# Patient Record
Sex: FEMALE | Race: WHITE | NOT HISPANIC OR LATINO | Employment: FULL TIME | URBAN - NONMETROPOLITAN AREA
[De-identification: names, ages, dates, MRNs, and addresses within clinical notes are randomized per-mention and may not be internally consistent; named-entity substitution may affect disease eponyms.]

---

## 2017-07-27 ENCOUNTER — TRANSCRIBE ORDERS (OUTPATIENT)
Dept: ADMINISTRATIVE | Facility: HOSPITAL | Age: 32
End: 2017-07-27

## 2017-07-27 DIAGNOSIS — N63.0 BREAST NODULE: ICD-10-CM

## 2017-07-27 DIAGNOSIS — N64.4 BREAST PAIN, LEFT: Primary | ICD-10-CM

## 2017-07-31 ENCOUNTER — HOSPITAL ENCOUNTER (OUTPATIENT)
Dept: ULTRASOUND IMAGING | Facility: HOSPITAL | Age: 32
Discharge: HOME | End: 2017-07-31
Admitting: NURSE PRACTITIONER

## 2017-07-31 DIAGNOSIS — N64.4 BREAST PAIN, LEFT: ICD-10-CM

## 2017-07-31 DIAGNOSIS — N63.0 BREAST NODULE: ICD-10-CM

## 2017-07-31 PROCEDURE — 76642 ULTRASOUND BREAST LIMITED: CPT

## 2018-02-07 ENCOUNTER — TRANSCRIBE ORDERS (OUTPATIENT)
Dept: ADMINISTRATIVE | Facility: HOSPITAL | Age: 33
End: 2018-02-07

## 2018-02-07 DIAGNOSIS — N63.0 BREAST LUMP: Primary | ICD-10-CM

## 2018-02-08 ENCOUNTER — HOSPITAL ENCOUNTER (OUTPATIENT)
Dept: ULTRASOUND IMAGING | Facility: HOSPITAL | Age: 33
Discharge: HOME | End: 2018-02-08
Admitting: FAMILY MEDICINE

## 2018-02-08 DIAGNOSIS — N63.0 BREAST LUMP: ICD-10-CM

## 2018-02-08 PROCEDURE — 76642 ULTRASOUND BREAST LIMITED: CPT

## 2019-03-27 ENCOUNTER — OFFICE VISIT (OUTPATIENT)
Dept: OTOLARYNGOLOGY | Facility: CLINIC | Age: 34
End: 2019-03-27

## 2019-03-27 VITALS
HEART RATE: 80 BPM | BODY MASS INDEX: 25.61 KG/M2 | SYSTOLIC BLOOD PRESSURE: 145 MMHG | DIASTOLIC BLOOD PRESSURE: 82 MMHG | HEIGHT: 64 IN | RESPIRATION RATE: 20 BRPM | WEIGHT: 150 LBS | TEMPERATURE: 98 F

## 2019-03-27 DIAGNOSIS — D48.9 NEOPLASM OF UNCERTAIN BEHAVIOR: Primary | ICD-10-CM

## 2019-03-27 PROCEDURE — 99203 OFFICE O/P NEW LOW 30 MIN: CPT | Performed by: OTOLARYNGOLOGY

## 2019-03-27 RX ORDER — MULTIPLE VITAMINS W/ MINERALS TAB 9MG-400MCG
1 TAB ORAL DAILY
COMMUNITY

## 2019-03-27 RX ORDER — LEVOTHYROXINE SODIUM 0.05 MG/1
50 TABLET ORAL DAILY
COMMUNITY

## 2019-03-27 NOTE — PROGRESS NOTES
"CC:   Chief Complaint   Patient presents with   • Skin Lesion     Neck Lesion       HPI: Maris Luther is a 33 y.o. female who reports a skin lesion on the left neck.  This lesion has been present for 2 years.  The lesion has changed. She reports this lesion has been changing in color. She also states this lesion easily becomes irritated and sore.  She then will find herself picking at it sometimes.  Nothing makes the lesion better or worse.  A biopsy has not been performed.    Prior history of skin cancer: none    Dermatologist: Isabelle Singleton MD      PFSH:  Past Medical History:   Diagnosis Date   • Hypothyroidism    • Neoplasm of uncertain behavior      Past Surgical History:   Procedure Laterality Date   •  SECTION       History reviewed. No pertinent family history.  Social History     Tobacco Use   • Smoking status: Never Smoker   • Smokeless tobacco: Never Used   Substance Use Topics   • Alcohol use: Yes     Comment: social   • Drug use: Not on file     Allergies:  Penicillins    Current Outpatient Medications:   •  levothyroxine (SYNTHROID, LEVOTHROID) 50 MCG tablet, Take 50 mcg by mouth Daily., Disp: , Rfl:   •  Multiple Vitamins-Minerals (MULTIVITAMIN WITH MINERALS) tablet tablet, Take 1 tablet by mouth Daily., Disp: , Rfl:     ROS:  Review of Systems   Constitutional: Negative for activity change, appetite change, chills, fatigue, fever and unexpected weight change.   HENT: Negative for congestion, dental problem, facial swelling and nosebleeds.    Eyes: Negative for discharge and redness.   Skin: Negative for color change, pallor and rash.   Hematological: Negative for adenopathy. Does not bruise/bleed easily.       PE:  /82   Pulse 80   Temp 98 °F (36.7 °C)   Resp 20   Ht 162.6 cm (64\")   Wt 68 kg (150 lb)   BMI 25.75 kg/m²   Physical Exam   Constitutional: She is oriented to person, place, and time. She appears well-developed and well-nourished. She is cooperative. No " distress.   HENT:   Head: Normocephalic and atraumatic.   Right Ear: External ear normal.   Left Ear: External ear normal.   Nose: Nose normal. No nasal deformity.   Mouth/Throat: Uvula is midline, oropharynx is clear and moist and mucous membranes are normal.   Eyes: Conjunctivae, EOM and lids are normal. Pupils are equal, round, and reactive to light. Right eye exhibits no discharge. Left eye exhibits no discharge. No scleral icterus.   Neck: Normal range of motion and phonation normal. Neck supple. No tracheal deviation present.       Pulmonary/Chest: Effort normal. No stridor.   Musculoskeletal: Normal range of motion. She exhibits no edema or deformity.   Lymphadenopathy:     She has no cervical adenopathy.   Neurological: She is alert and oriented to person, place, and time. She has normal strength. No cranial nerve deficit. Coordination normal.   Skin: Skin is warm and dry. No rash noted. She is not diaphoretic. No erythema. No pallor.   Psychiatric: She has a normal mood and affect. Her speech is normal and behavior is normal. Judgment and thought content normal. Cognition and memory are normal.   Nursing note and vitals reviewed.                  Assessment:  1. Neoplasm of uncertain behavior        Plan:    1.  I have offered excision of the lesion of the left neck with linear closure and permanent section analysis in the office under local anesthesia.  Since the lesion is changing and irritated, I feel this would be medically necessary to exclude dysplasia.  Dermoscopy performed and imported.  2. The risks and benefits of my recommendations, as well as other treatment options were discussed with the patient today.   3. Discussion of skin lesion. Discussed risks, benefits, alternatives, and possible complications of excision of the skin lesion with reconstruction utilizing local tissue rearrangement, full-thickness skin grafting, or local interpolated flaps. Risks include, but are not limited too:  bleeding, infection, hematoma, recurrence, need for additional procedures, flap failure, cosmetic deformity. Patient understands risks and would like to proceed with surgery.     QUALITY MEASURES    Body Mass Index Screening and Follow-Up Plan  Body mass index is 25.75 kg/m².  Patient's Body mass index is 25.75 kg/m². BMI is above normal parameters. Recommendations include: referral to primary care.    Tobacco Use: Screening and Cessation Intervention  Social History    Tobacco Use      Smoking status: Never Smoker      Smokeless tobacco: Never Used    Return for 1 week postoperatively.      Blair Clemente MD   03/27/2019  9:41 AM

## 2019-06-25 ENCOUNTER — PROCEDURE VISIT (OUTPATIENT)
Dept: OTOLARYNGOLOGY | Facility: CLINIC | Age: 34
End: 2019-06-25

## 2019-06-25 VITALS
TEMPERATURE: 98 F | HEIGHT: 64 IN | WEIGHT: 150 LBS | SYSTOLIC BLOOD PRESSURE: 132 MMHG | DIASTOLIC BLOOD PRESSURE: 78 MMHG | BODY MASS INDEX: 25.61 KG/M2 | RESPIRATION RATE: 20 BRPM | HEART RATE: 95 BPM

## 2019-06-25 DIAGNOSIS — D48.5 NEOPLASM OF UNCERTAIN BEHAVIOR OF SKIN: ICD-10-CM

## 2019-06-25 DIAGNOSIS — D48.9 NEOPLASM OF UNCERTAIN BEHAVIOR: Primary | ICD-10-CM

## 2019-06-25 PROCEDURE — 88305 TISSUE EXAM BY PATHOLOGIST: CPT | Performed by: OTOLARYNGOLOGY

## 2019-06-25 PROCEDURE — 11421 EXC H-F-NK-SP B9+MARG 0.6-1: CPT | Performed by: OTOLARYNGOLOGY

## 2019-06-25 PROCEDURE — 12041 INTMD RPR N-HF/GENIT 2.5CM/<: CPT | Performed by: OTOLARYNGOLOGY

## 2019-06-25 NOTE — PROGRESS NOTES
Preprocedure diagnosis: Neoplasm of uncertain behavior skin of left neck    Post procedure diagnosis: Neoplasm of uncertain behavior skin of left neck    Procedure performed: 1) Excision malignant neoplasm of skin of left neck, 7 mm x 16 mm     2) intermediate closure of skin of left neck, 2.0 cm    Surgeon: Blair Clemente M.D.    Anesthesia: Local with 3 cc of 1% lidocaine with 1:100,000 epinephrine    Specimen:  Neoplasm of uncertain behavior skin of left neck - stitch at 12 o'clock, permanent     Complications: None    Disposition: Home    Lesion: 4 mm x 4 mm  Margins: 1.5 mm  Defect: 7 mm x 16 mm  Depth: Through dermis  Closure Length: 2.0 cm    Details: After patient verification and informed consent was obtained, the skin was prepped with alcohol and infiltrated with 1% lidocaine with 1:100,000 epinephrine.  After approximately 10-15 minutes the skin was tested for anesthesia.  The skin was then sterilely prepped with Hibiclens and the patient was sterilely draped.    The skin surrounding the lesion was incised in fusiform fashion with a 15 blade beveling the incision laterally to facilitate wound eversion.  The skin was grasped and the lesion was removed from the underlying tissue utilizing sharp dissection with the 15 blade.  The specimen was oriented with a stitch at 12 o'clock and sent for frozen section analysis.  Hemostasis was obtained with bipolar cautery set at 12.      The surrounding skin was undermined in the subcutaneous plane for approximately 1 cm in each direction utilizing a fresh 15 blade.  Hemostasis was again obtained with bipolar cautery set at 12.  The tissue was advanced and closed utilizing 5-0 undyed Vicryl suture in buried fashion, followed by simple 5-0 Prolene suture.  Antibiotic ointment was placed to the incision.    The patient tolerated the procedure without any complication.

## 2019-06-27 LAB
CYTO UR: NORMAL
LAB AP CASE REPORT: NORMAL
LAB AP CLINICAL INFORMATION: NORMAL
PATH REPORT.FINAL DX SPEC: NORMAL
PATH REPORT.GROSS SPEC: NORMAL

## 2019-07-01 ENCOUNTER — OFFICE VISIT (OUTPATIENT)
Dept: OTOLARYNGOLOGY | Facility: CLINIC | Age: 34
End: 2019-07-01

## 2019-07-01 VITALS — RESPIRATION RATE: 16 BRPM | BODY MASS INDEX: 25.61 KG/M2 | HEIGHT: 64 IN | WEIGHT: 150 LBS

## 2019-07-01 DIAGNOSIS — D22.4 COMPOUND NEVUS OF NECK: Primary | ICD-10-CM

## 2019-07-01 PROCEDURE — 99024 POSTOP FOLLOW-UP VISIT: CPT | Performed by: OTOLARYNGOLOGY

## 2019-07-01 NOTE — PROGRESS NOTES
Maris Luther returns to the office following excision of a lesion of the left neck with intermediate closure on June 25, 2019.    SUBJECTIVE:  Denies any fevers, chills, significant pain.    OBJECTIVE:  There were no vitals taken for this visit.  Sutures were removed.  Per my MA, this is healing well    Pathology:      ASSESSMENT:  Diagnoses and all orders for this visit:    Compound nevus of neck        PLAN:   Follow-up 3 months    Protect the incisions from sunlight. Sunlight to the incisions will cause permanent pigmentation to the incision line and make the incision more noticeable. After the incision has reepithelialized (typically 2-3 weeks after the procedure), you may begin to use sunscreen with an SPF of 15 or greater    I discussed the use of a silicone-based wound cream to the incisions to optimize the end result. Apply topically twice daily, or as directed, to help optimize wound healing and decrease redness.    We discussed the importance of routine skin checks with a dermatologist or your PCP every 6-12 months.       Blair Clemente MD   07/01/2019  11:12 AM

## 2020-01-23 ENCOUNTER — OFFICE VISIT (OUTPATIENT)
Dept: OBGYN | Age: 35
End: 2020-01-23
Payer: COMMERCIAL

## 2020-01-23 VITALS
SYSTOLIC BLOOD PRESSURE: 134 MMHG | HEIGHT: 64 IN | WEIGHT: 149 LBS | DIASTOLIC BLOOD PRESSURE: 82 MMHG | BODY MASS INDEX: 25.44 KG/M2

## 2020-01-23 PROCEDURE — 99385 PREV VISIT NEW AGE 18-39: CPT | Performed by: ADVANCED PRACTICE MIDWIFE

## 2020-01-23 RX ORDER — LEVOTHYROXINE SODIUM 0.1 MG/1
75 TABLET ORAL DAILY
COMMUNITY

## 2020-01-23 NOTE — PROGRESS NOTES
Pt is here for breast exam and pap smear.    She is a transfer from Dr. Caitlin Beltran    Last mammogram:  na  Last pap smear:  2018  Contraception:   had vasectomy  :  3  Para:  3  AB:    Last bone density:  na  Last colonoscopy:   na
distress. Breath sounds: Normal breath sounds. Abdominal:      General: There is no distension. Palpations: Abdomen is soft. There is no mass. Tenderness: There is no tenderness. There is no guarding or rebound. Hernia: No hernia is present. Genitourinary:     Vagina: Normal.      Comments: EGBUS normal. Vulva reveals no erythema, lesions, or atrophic changes. Vaginal normal, no lesions, polyps, or discharge. Cervix is normal, smooth, pink mucosa. No lesions. No polyps. Musculoskeletal: Normal range of motion. Skin:     General: Skin is warm and dry. Neurological:      Mental Status: She is alert and oriented to person, place, and time. Psychiatric:         Behavior: Behavior normal.         Thought Content: Thought content normal.         Judgment: Judgment normal.          Diagnosis Orders   1. Women's annual routine gynecological examination  Human papillomavirus (HPV) DNA probe thin prep high risk    PAP SMEAR   2. Screening for HPV (human papillomavirus)  Human papillomavirus (HPV) DNA probe thin prep high risk    PAP SMEAR   3. Cervical cancer screening  Human papillomavirus (HPV) DNA probe thin prep high risk    PAP SMEAR   4. Screening breast examination     5. History of abnormal cervical Pap smear         PLAN:  MEDICATIONS:  No orders of the defined types were placed in this encounter. ORDERS:  Orders Placed This Encounter   Procedures    Human papillomavirus (HPV) DNA probe thin prep high risk    PAP SMEAR       Plan:    ICD-10-CM    1. Women's annual routine gynecological examination Z01.419 Human papillomavirus (HPV) DNA probe thin prep high risk     PAP SMEAR   2. Screening for HPV (human papillomavirus) Z11.51 Human papillomavirus (HPV) DNA probe thin prep high risk     PAP SMEAR   3. Cervical cancer screening Z12.4 Human papillomavirus (HPV) DNA probe thin prep high risk     PAP SMEAR   4. Screening breast examination Z12.39    5.  History of abnormal cervical

## 2020-01-29 LAB
HPV COMMENT: NORMAL
HPV TYPE 16: NOT DETECTED
HPV TYPE 18: NOT DETECTED
HPVOH (OTHER TYPES): NOT DETECTED

## 2021-06-18 ENCOUNTER — OFFICE VISIT (OUTPATIENT)
Dept: OBGYN CLINIC | Age: 36
End: 2021-06-18
Payer: COMMERCIAL

## 2021-06-18 VITALS — BODY MASS INDEX: 25.58 KG/M2 | WEIGHT: 149 LBS | DIASTOLIC BLOOD PRESSURE: 82 MMHG | SYSTOLIC BLOOD PRESSURE: 145 MMHG

## 2021-06-18 DIAGNOSIS — Z12.4 SCREENING FOR CERVICAL CANCER: ICD-10-CM

## 2021-06-18 DIAGNOSIS — Z01.419 WOMEN'S ANNUAL ROUTINE GYNECOLOGICAL EXAMINATION: Primary | ICD-10-CM

## 2021-06-18 DIAGNOSIS — I10 HYPERTENSION, UNSPECIFIED TYPE: ICD-10-CM

## 2021-06-18 DIAGNOSIS — K64.0 GRADE I HEMORRHOIDS: ICD-10-CM

## 2021-06-18 DIAGNOSIS — E03.9 HYPOTHYROIDISM WITHOUT PALPABLE NODULE: ICD-10-CM

## 2021-06-18 PROCEDURE — 99395 PREV VISIT EST AGE 18-39: CPT | Performed by: NURSE PRACTITIONER

## 2021-06-18 RX ORDER — HYDROCORTISONE 25 MG/G
CREAM TOPICAL
Qty: 28 G | Refills: 1 | Status: SHIPPED | OUTPATIENT
Start: 2021-06-18

## 2021-06-18 ASSESSMENT — ENCOUNTER SYMPTOMS
GASTROINTESTINAL NEGATIVE: 1
EYES NEGATIVE: 1
RESPIRATORY NEGATIVE: 1
BLOOD IN STOOL: 0
ALLERGIC/IMMUNOLOGIC NEGATIVE: 1
ANAL BLEEDING: 0

## 2021-06-18 NOTE — PROGRESS NOTES
[facility]  CNM Office Note    Tal Garrett is a 37 y.o. female who presents today for her medical conditions/ complaints as noted below. Chief Complaint   Patient presents with    Gynecologic Exam         HPI  Pt presents for annual exam with concern of BP being elevated slightly over the last 6months+. She also has some rectal pressure since hemorrhoids she had doing lifting with exercise several months ago that went away but pressure still there. Just used tucks pads otc. PCP does annual labs      There is no problem list on file for this patient. Patient's last menstrual period was 2021.     History reviewed. No pertinent past medical history. History reviewed. No pertinent surgical history. Family History   Problem Relation Age of Onset    Hypertension Father     Hypertension Mother     Breast Cancer Maternal Aunt      Social History     Tobacco Use    Smoking status: Never Smoker    Smokeless tobacco: Never Used   Substance Use Topics    Alcohol use: Not Currently       Current Outpatient Medications   Medication Sig Dispense Refill    hydrocortisone (ANUSOL-HC) 2.5 % CREA rectal cream Apply twice daily [place] 28 g 1    levothyroxine (SYNTHROID) 100 MCG tablet Take 75 mcg by mouth Daily      Multiple Vitamin (MULTI VITAMIN DAILY PO) Take by mouth      Omega-3 Fatty Acids (FISH OIL) 600 MG CAPS Take by mouth       No current facility-administered medications for this visit. Allergies   Allergen Reactions    Penicillins Anaphylaxis     Vitals:    21 0932   BP: (!) 145/82     Body mass index is 25.58 kg/m². Review of Systems   Constitutional: Negative. HENT: Negative. Eyes: Negative. Respiratory: Negative. Cardiovascular: Negative. Gastrointestinal: Negative. Negative for anal bleeding and blood in stool. Rectal pain: hemorrhoids. Endocrine: Negative. Genitourinary: Negative.   Negative for dyspareunia, frequency, menstrual problem, urgency and vaginal discharge. Musculoskeletal: Negative. Skin: Negative. Allergic/Immunologic: Negative. Neurological: Negative. Negative for headaches. Hematological: Negative. Psychiatric/Behavioral: Negative. Physical Exam  Vitals and nursing note reviewed. Constitutional:       Appearance: She is well-developed. HENT:      Head: Normocephalic and atraumatic. Eyes:      Conjunctiva/sclera: Conjunctivae normal.      Pupils: Pupils are equal, round, and reactive to light. Neck:      Thyroid: No thyromegaly. Trachea: No tracheal deviation. Cardiovascular:      Rate and Rhythm: Normal rate and regular rhythm. Heart sounds: Normal heart sounds. No murmur heard. Pulmonary:      Effort: Pulmonary effort is normal. No respiratory distress. Breath sounds: Normal breath sounds. Chest:      Comments: Breasts symmetrical without tenderness, masses, or nipple discharge. Nipples everted bilaterally. Abdominal:      General: There is no distension. Palpations: Abdomen is soft. Tenderness: There is no abdominal tenderness. There is no guarding. Genitourinary:     General: Normal vulva. Exam position: Lithotomy position. Labia:         Right: No rash or lesion. Left: No rash or lesion. Vagina: Normal. No erythema or lesions. Cervix: Normal.      Uterus: Normal. Not tender. Adnexa: Right adnexa normal and left adnexa normal.        Right: No mass, tenderness or fullness. Left: No mass, tenderness or fullness. Musculoskeletal:         General: Normal range of motion. Cervical back: Normal range of motion and neck supple. Skin:     General: Skin is warm and dry. Capillary Refill: Capillary refill takes less than 2 seconds. Neurological:      Mental Status: She is alert and oriented to person, place, and time. Psychiatric:         Behavior: Behavior normal.         Thought Content:  Thought content normal.         Judgment: Judgment normal.          Diagnosis Orders   1. Women's annual routine gynecological examination  PAP SMEAR    Human papillomavirus (HPV) DNA probe thin prep high risk   2. Screening for cervical cancer  PAP SMEAR    Human papillomavirus (HPV) DNA probe thin prep high risk   3. Grade I hemorrhoids     4. Hypertension, unspecified type     5. Hypothyroidism without palpable nodule         MEDICATIONS:  Orders Placed This Encounter   Medications    hydrocortisone (ANUSOL-HC) 2.5 % CREA rectal cream     Sig: Apply twice daily [place]     Dispense:  28 g     Refill:  1       ORDERS:  Orders Placed This Encounter   Procedures    PAP SMEAR    Human papillomavirus (HPV) DNA probe thin prep high risk       PLAN:  1. WWE- pap collected, SBE reviewed and CBE performed. Annual labs with PCP  2. Hemorrhoids- rx sent and encouraged f/u if pressure continues following completion  3. HTN- pt works with cardiology and will check BP a few times this next week and if still elevated will see provider there. Over 50% of the total visit time of 30 minutes was spent on counseling and/or coordination of care of mild HTN and f/u, hemorrhoids tx risk warnings. All questions were answered and the patient voiced understanding.

## 2021-06-18 NOTE — PROGRESS NOTES
Pt presents today for pap smear and breast exam.  She also complains of Hemorid's she has noticed since working out she is having a lot of rectal pressure. Pt asked BP to be taken manual she has noticed BP higher lately.      Last mammogram:  never   Last pap smear:   2/2020  Contraception:  none   Last bone density:  never  Last colonoscopy:   never

## 2023-06-12 ENCOUNTER — OFFICE VISIT (OUTPATIENT)
Dept: GASTROENTEROLOGY | Facility: CLINIC | Age: 38
End: 2023-06-12
Payer: COMMERCIAL

## 2023-06-12 VITALS
HEART RATE: 104 BPM | HEIGHT: 65 IN | OXYGEN SATURATION: 100 % | WEIGHT: 135 LBS | SYSTOLIC BLOOD PRESSURE: 134 MMHG | DIASTOLIC BLOOD PRESSURE: 82 MMHG | TEMPERATURE: 97.7 F | BODY MASS INDEX: 22.49 KG/M2

## 2023-06-12 DIAGNOSIS — R19.7 DIARRHEA, UNSPECIFIED TYPE: ICD-10-CM

## 2023-06-12 DIAGNOSIS — R10.31 RIGHT LOWER QUADRANT ABDOMINAL PAIN: ICD-10-CM

## 2023-06-12 DIAGNOSIS — R19.4 CHANGE IN BOWEL HABITS: Primary | ICD-10-CM

## 2023-06-12 PROCEDURE — 99204 OFFICE O/P NEW MOD 45 MIN: CPT | Performed by: NURSE PRACTITIONER

## 2023-06-12 RX ORDER — SODIUM PICOSULFATE, MAGNESIUM OXIDE, AND ANHYDROUS CITRIC ACID 10; 3.5; 12 MG/160ML; G/160ML; G/160ML
175 LIQUID ORAL TAKE AS DIRECTED
Qty: 350 ML | Refills: 0 | Status: SHIPPED | OUTPATIENT
Start: 2023-06-12

## 2023-06-12 NOTE — PROGRESS NOTES
Primary Physician: Dashawn Aj Jr, MD    Chief Complaint   Patient presents with    Abdominal Pain     Pt had RLQ pain back in May for about 4-5 days-states at that time she saw blood in her stool once-had CT 2023 at [facility]-pain and bleeding have resolved at this time    Diarrhea     Pt states for the last couple of months she has been having diarrhea-had stool studies 2023 at [facility] that were normal; Pt states the diarrhea isn't excessive but every time she has a BM it's loose        Subjective     Maris Luther is a 37 y.o. female.    HPI  Abdominal Pain/Change in Bowels (diarrhea)  Pt referred for evaluation of right lower quadrant abdominal pain.  For the past 1-2 months she has noticed loose stools. She did develop right lower quadrant pain with a little bit of blood in her stools.  Non bloody water diarrhea continues. No further blood seen in stools for greater than 1 month. Pt had CT non-contrasted May 2023 with no acute findings.  Pt reports the CT Tech told her she had a right ovarian cyst.  The pain in her right lower region has resolved. No further pain and no bleeding.  However loose daily stools continue.  She has even had diarrhea awake her in the middle of the night.    CT Scan of Abd/Pelvis without contrast 2023 with No acute abnormality appreciated.    No family hx colon cancer.    Pt had negative CDiff 2023, Negative stools culture and Negative GI Panel.    Past Medical History:   Diagnosis Date    Compound nevus of neck     Hypothyroidism     Neoplasm of uncertain behavior        Past Surgical History:   Procedure Laterality Date     SECTION      EXCISION LESION      Compound Nevus of Neck    GANGLION CYST EXCISION Left     From left hand        Current Outpatient Medications:     Sod Picosulfate-Mag Ox-Cit Acd (Clenpiq) 10-3.5-12 MG-GM -GM/160ML solution, Take 175 mL by mouth Take As Directed., Disp: 350 mL, Rfl: 0    Allergies   Allergen Reactions     "Iodinated Contrast Media Swelling     Right eye swelling     Penicillins Other (See Comments)     As a child-can tolerate derivatives of PCN       Social History     Socioeconomic History    Marital status:    Tobacco Use    Smoking status: Never    Smokeless tobacco: Never   Vaping Use    Vaping Use: Never used   Substance and Sexual Activity    Alcohol use: Yes     Comment: Socially    Drug use: No    Sexual activity: Defer       Family History   Problem Relation Age of Onset    Colon cancer Neg Hx     Colon polyps Neg Hx     Esophageal cancer Neg Hx     Liver cancer Neg Hx     Stomach cancer Neg Hx     Rectal cancer Neg Hx     Liver disease Neg Hx        Review of Systems   Constitutional:  Negative for unexpected weight change.   Respiratory:  Negative for shortness of breath.    Cardiovascular:  Negative for chest pain.     Objective     /82 (BP Location: Left arm, Patient Position: Sitting, Cuff Size: Adult)   Pulse 104   Temp 97.7 °F (36.5 °C) (Infrared)   Ht 165.1 cm (65\")   Wt 61.2 kg (135 lb)   SpO2 100%   Breastfeeding No   BMI 22.47 kg/m²     Physical Exam  Vitals reviewed.   Constitutional:       Appearance: Normal appearance.   Cardiovascular:      Rate and Rhythm: Normal rate and regular rhythm.      Heart sounds: Normal heart sounds.   Pulmonary:      Effort: Pulmonary effort is normal.      Breath sounds: Normal breath sounds.   Neurological:      Mental Status: She is alert.             IMPRESSION/PLAN:    Assessment & Plan      Problem List Items Addressed This Visit          Gastrointestinal Abdominal     Right lower quadrant abdominal pain    Overview     RLQ Pain that has subsided greater than 1 month ago.  Negative CT of the abdomen/pelvis without contrast 5/19/23         Relevant Medications    Sod Picosulfate-Mag Ox-Cit Acd (Clenpiq) 10-3.5-12 MG-GM -GM/160ML solution    Other Relevant Orders    Case Request (Completed)    Change in bowel habits - Primary    Overview     " Loose stools  5/17/23 Negative stool culture, negative CDiff, Neg GI Panel         Relevant Medications    Sod Picosulfate-Mag Ox-Cit Acd (Clenpiq) 10-3.5-12 MG-GM -GM/160ML solution    Other Relevant Orders    Case Request (Completed)    Diarrhea    Overview     Pt had negative CDiff 5/17/2023, Negative stools culture and Negative GI Panel.  Non bloody diarrhea daily at this point, even awakening her at night          Colonoscopy per Dr Hu with biopsy is recommended.  Clinpiq Prep  Pt works at [employer]. She is unsure if she would like to schedule colonoscopy here at [facility] or [facility]. She is going to check with insurance company to see which place is cheaper.  I recommend colonoscopy to rule out colon cancers, colitis or other colon issues. Without colonoscopy we would not be able to rule out a malignancies. Pt verbalizes understanding.        ..The risks, benefits, and alternatives of colonoscopy were reviewed with the patient today.  Risks including perforation of the colon possibly requiring surgery or colostomy.  Additional risks include risk of bleeding from biopsies or removal of colon tissue.  There is also the risk of a drug reaction or problems with anesthesia.  This will be discussed with the further by the anesthesia team on the day of the procedure.  Lastly there is a possibility of missing a colon polyp or cancer.  The benefits include the diagnosis and management of disease of the colon and rectum.  Alternatives to colonoscopy include barium enema, laboratory testing, radiographic evaluation, or no intervention.  The patient verbalizes understanding and agrees.    In accordance with requirements under the Affordable Care Act, [facility] has provided pricing for all hospital services and items on each of its websites. However, a patient's actual cost may differ based on the services the patient receives to meet individual healthcare needs and based on the benefits provided  under the patient’s insurance coverage.        Val Francois, APRN  06/12/23  14:11 CDT    Part of this note may be an electronic transcription/translation of spoken language to printed text.

## 2023-07-03 ENCOUNTER — OFFICE VISIT (OUTPATIENT)
Dept: OBGYN CLINIC | Age: 38
End: 2023-07-03
Payer: COMMERCIAL

## 2023-07-03 VITALS
WEIGHT: 138 LBS | DIASTOLIC BLOOD PRESSURE: 70 MMHG | HEIGHT: 64 IN | SYSTOLIC BLOOD PRESSURE: 130 MMHG | BODY MASS INDEX: 23.56 KG/M2

## 2023-07-03 DIAGNOSIS — Z12.4 SCREENING FOR CERVICAL CANCER: ICD-10-CM

## 2023-07-03 DIAGNOSIS — Z01.419 ENCOUNTER FOR GYNECOLOGICAL EXAMINATION WITHOUT ABNORMAL FINDING: Primary | ICD-10-CM

## 2023-07-03 DIAGNOSIS — Z12.39 ENCOUNTER FOR SCREENING BREAST EXAMINATION: ICD-10-CM

## 2023-07-03 DIAGNOSIS — R10.31 RIGHT LOWER QUADRANT PAIN: ICD-10-CM

## 2023-07-03 PROCEDURE — 99395 PREV VISIT EST AGE 18-39: CPT | Performed by: NURSE PRACTITIONER

## 2023-07-03 ASSESSMENT — ENCOUNTER SYMPTOMS
EYES NEGATIVE: 1
ALLERGIC/IMMUNOLOGIC NEGATIVE: 1
RESPIRATORY NEGATIVE: 1

## 2023-07-07 LAB
HPV HR 12 DNA SPEC QL NAA+PROBE: NOT DETECTED
HPV16 DNA SPEC QL NAA+PROBE: NOT DETECTED
HPV16+18+H RISK 12 DNA SPEC-IMP: NORMAL
HPV18 DNA SPEC QL NAA+PROBE: NOT DETECTED

## 2023-07-20 ENCOUNTER — HOSPITAL ENCOUNTER (OUTPATIENT)
Facility: HOSPITAL | Age: 38
Setting detail: HOSPITAL OUTPATIENT SURGERY
Discharge: HOME | End: 2023-07-20
Attending: INTERNAL MEDICINE | Admitting: INTERNAL MEDICINE
Payer: COMMERCIAL

## 2023-07-20 VITALS
OXYGEN SATURATION: 100 % | HEART RATE: 67 BPM | TEMPERATURE: 97.8 F | DIASTOLIC BLOOD PRESSURE: 84 MMHG | HEIGHT: 64 IN | SYSTOLIC BLOOD PRESSURE: 116 MMHG | BODY MASS INDEX: 23.05 KG/M2 | WEIGHT: 135 LBS | RESPIRATION RATE: 17 BRPM

## 2023-07-20 DIAGNOSIS — R19.4 CHANGE IN BOWEL HABITS: ICD-10-CM

## 2023-07-20 DIAGNOSIS — R10.31 RIGHT LOWER QUADRANT ABDOMINAL PAIN: ICD-10-CM

## 2023-07-20 PROBLEM — Z86.010 HISTORY OF ADENOMATOUS POLYP OF COLON: Status: ACTIVE | Noted: 2023-07-20

## 2023-07-20 LAB — B-HCG UR QL: NEGATIVE

## 2023-07-20 PROCEDURE — 45380 COLONOSCOPY AND BIOPSY: CPT | Performed by: INTERNAL MEDICINE

## 2023-07-20 PROCEDURE — 45385 COLONOSCOPY W/LESION REMOVAL: CPT | Performed by: INTERNAL MEDICINE

## 2023-07-20 PROCEDURE — 81025 URINE PREGNANCY TEST: CPT | Performed by: ANESTHESIOLOGY

## 2023-07-20 PROCEDURE — 88305 TISSUE EXAM BY PATHOLOGIST: CPT | Performed by: INTERNAL MEDICINE

## 2023-07-20 RX ORDER — LIDOCAINE HYDROCHLORIDE 10 MG/ML
0.5 INJECTION, SOLUTION EPIDURAL; INFILTRATION; INTRACAUDAL; PERINEURAL ONCE AS NEEDED
Status: DISCONTINUED | OUTPATIENT
Start: 2023-07-20 | End: 2023-07-20 | Stop reason: HOSPADM

## 2023-07-20 RX ORDER — SODIUM CHLORIDE 0.9 % (FLUSH) 0.9 %
10 SYRINGE (ML) INJECTION AS NEEDED
Status: DISCONTINUED | OUTPATIENT
Start: 2023-07-20 | End: 2023-07-20 | Stop reason: HOSPADM

## 2023-07-20 RX ORDER — SODIUM CHLORIDE 9 MG/ML
500 INJECTION, SOLUTION INTRAVENOUS CONTINUOUS PRN
Status: DISCONTINUED | OUTPATIENT
Start: 2023-07-20 | End: 2023-07-20 | Stop reason: HOSPADM

## 2023-07-20 RX ADMIN — SODIUM CHLORIDE 500 ML: 9 INJECTION, SOLUTION INTRAVENOUS at 08:29

## 2023-07-20 NOTE — H&P
Chief Complaint:   Change in bowel pattern    Subjective     HPI:   Abdominal Pain/Change in Bowels (diarrhea)  Pt referred for evaluation of right lower quadrant abdominal pain.  For the past 1-2 months she has noticed loose stools. She did develop right lower quadrant pain with a little bit of blood in her stools.  Non bloody water diarrhea continues. No further blood seen in stools for greater than 1 month. Pt had CT non-contrasted May 2023 with no acute findings.  Pt reports the CT Tech told her she had a right ovarian cyst.  The pain in her right lower region has resolved. No further pain and no bleeding.  However loose daily stools continue.  She has even had diarrhea awake her in the middle of the night.     CT Scan of Abd/Pelvis without contrast 2023 with No acute abnormality appreciated.     No family hx colon cancer.     Pt had negative CDiff 2023, Negative stools culture and Negative GI Panel.    Past Medical History:   Past Medical History:   Diagnosis Date    Compound nevus of neck     Hypothyroidism     Neoplasm of uncertain behavior        Past Surgical History:  Past Surgical History:   Procedure Laterality Date     SECTION      EXCISION LESION      Compound Nevus of Neck    GANGLION CYST EXCISION Left     From left hand    WISDOM TOOTH EXTRACTION          Family History:  Family History   Problem Relation Age of Onset    Colon cancer Neg Hx     Colon polyps Neg Hx     Esophageal cancer Neg Hx     Liver cancer Neg Hx     Stomach cancer Neg Hx     Rectal cancer Neg Hx     Liver disease Neg Hx        Social History:   reports that she has never smoked. She has never used smokeless tobacco. She reports current alcohol use. She reports that she does not use drugs.    Medications:   Medications Prior to Admission   Medication Sig Dispense Refill Last Dose    Sod Picosulfate-Mag Ox-Cit Acd (Clenpiq) 10-3.5-12 MG-GM -GM/160ML solution Take 175 mL by mouth Take As Directed. 350 mL 0   "      Allergies:  Iodinated contrast media and Penicillins    ROS:    Resp: No SOA  Cardiovascular: No CP      Objective     /92   Pulse 104   Temp 97.8 °F (36.6 °C) (Temporal)   Resp 18   Ht 162.6 cm (64\")   Wt 61.2 kg (135 lb)   SpO2 100%   BMI 23.17 kg/m²     Physical Exam   Constitutional: Patient is oriented to person, place, and in no distress.  Pulmonary/Chest: No distress.  No audible wheezes  Psychiatric: Mood, memory, affect and judgment appear normal.     Assessment & Plan     Diagnosis:  Diarrhea  Change in bowel pattern    Anticipated Surgical Procedure:  Colonoscopy    The risks, benefits, and alternatives of colonoscopy were reviewed with the patient today.  Risks including perforation of the colon possibly requiring surgery or colostomy.  Additional risks include risk of bleeding from biopsies or removal of colon tissue.  There is also the risk of a drug reaction or problems with anesthesia.  This will be discussed with the patient further by the anesthesia team on the day of the procedure.  Lastly there is a possibility of missing a colon polyp or cancer.  The benefits include the diagnosis and management of disease of the colon and rectum.  Alternatives to colonoscopy include barium enema, laboratory testing, radiographic evaluation, or no intervention.  The patient verbalizes understanding and agrees.        Please note that portions of this note were completed with a voice recognition program.         "

## 2023-07-21 LAB
CYTO UR: NORMAL
LAB AP CASE REPORT: NORMAL
Lab: NORMAL
PATH REPORT.FINAL DX SPEC: NORMAL
PATH REPORT.GROSS SPEC: NORMAL

## 2023-08-01 ASSESSMENT — ENCOUNTER SYMPTOMS: ABDOMINAL PAIN: 1

## (undated) DEVICE — MASK,OXYGEN,MED CONC,ADLT,7' TUB, UC: Brand: PENDING

## (undated) DEVICE — THE SINGLE USE ETRAP – POLYP TRAP IS USED FOR SUCTION RETRIEVAL OF ENDOSCOPICALLY REMOVED POLYPS.: Brand: ETRAP

## (undated) DEVICE — CUFF,BP,DISP,1 TUBE,ADULT,HP: Brand: MEDLINE

## (undated) DEVICE — Device: Brand: DEFENDO AIR/WATER/SUCTION AND BIOPSY VALVE

## (undated) DEVICE — SENSR O2 OXIMAX FNGR A/ 18IN NONSTR

## (undated) DEVICE — SNAR POLYP SENSATION MICRO OVL 13 240X40

## (undated) DEVICE — FRCP BIOP ENDO CAPTURA/PRO SERR SPK 2.8MM 230CM

## (undated) DEVICE — YANKAUER,BULB TIP WITH VENT: Brand: ARGYLE

## (undated) DEVICE — THE CHANNEL CLEANING BRUSH IS A NYLON FLEXI BRUSH ATTACHED TO A FLEXIBLE PLASTIC SHEATH DESIGNED TO SAFELY REMOVE DEBRIS FROM FLEXIBLE ENDOSCOPES.